# Patient Record
Sex: FEMALE | URBAN - METROPOLITAN AREA
[De-identification: names, ages, dates, MRNs, and addresses within clinical notes are randomized per-mention and may not be internally consistent; named-entity substitution may affect disease eponyms.]

---

## 2021-08-09 ENCOUNTER — OFFICE VISIT (OUTPATIENT)
Dept: URBAN - METROPOLITAN AREA CLINIC 27 | Facility: CLINIC | Age: 51
End: 2021-08-09
Payer: MEDICAID

## 2021-08-09 PROCEDURE — 92004 COMPRE OPH EXAM NEW PT 1/>: CPT | Performed by: OPHTHALMOLOGY

## 2021-08-09 PROCEDURE — 92250 FUNDUS PHOTOGRAPHY W/I&R: CPT | Performed by: OPHTHALMOLOGY

## 2021-08-09 PROCEDURE — 92235 FLUORESCEIN ANGRPH MLTIFRAME: CPT | Performed by: OPHTHALMOLOGY

## 2021-08-09 PROCEDURE — 92134 CPTRZ OPH DX IMG PST SGM RTA: CPT | Performed by: OPHTHALMOLOGY

## 2021-08-09 ASSESSMENT — INTRAOCULAR PRESSURE
OS: 19
OD: 18

## 2021-08-09 NOTE — IMPRESSION/PLAN
Impression: PDR w DME OS Plan: Exam/photos/OCT show ST NVE c/w active PDR with diffuse CSDME OS. FA sweeps 8/9/21 show leakage in the macula and peripheral ischemia with NV. Currently, there is no NVI and the IOP is within normal limits. The Dx, NHx, and Px PDR and DME were discussed at length. Reviewed options of observation, anti-VEGF, steroids, and laser; recommend anti-VEGF series. The patient understands that the goal of treatment is not to improve vision, but to reduce the likelihood of severe vision loss. The patient was urged to work closely with their PCP to avoid systemic complications of diabetic disease. 

1 week, Avastin OU 1/3

## 2021-08-09 NOTE — IMPRESSION/PLAN
Impression: severe NPDR w DME OD Plan: Exam/photos/OCT show DBH c/w severe NPDR with diffuse CSDME OD. FA  sweeps 8/9/21 show macular leakage and areas of ischemia w/o NVE. Reviewed options of observation, anti-VEGF, steroids, and laser; recommend Avastin series.

## 2021-08-16 ENCOUNTER — PROCEDURE (OUTPATIENT)
Dept: URBAN - METROPOLITAN AREA CLINIC 27 | Facility: CLINIC | Age: 51
End: 2021-08-16
Payer: MEDICAID

## 2021-08-16 ASSESSMENT — INTRAOCULAR PRESSURE
OS: 21
OD: 20

## 2021-09-13 ENCOUNTER — PROCEDURE (OUTPATIENT)
Dept: URBAN - METROPOLITAN AREA CLINIC 27 | Facility: CLINIC | Age: 51
End: 2021-09-13
Payer: MEDICAID

## 2021-09-13 DIAGNOSIS — E11.3512 TYPE 2 DIABETES MELLITUS WITH PROLIFERATIVE DIABETIC RETINOPATHY WITH MACULAR EDEMA, LEFT EYE: ICD-10-CM

## 2021-09-13 ASSESSMENT — INTRAOCULAR PRESSURE
OD: 20
OS: 19

## 2021-10-11 ENCOUNTER — PROCEDURE (OUTPATIENT)
Dept: URBAN - METROPOLITAN AREA CLINIC 27 | Facility: CLINIC | Age: 51
End: 2021-10-11
Payer: MEDICAID

## 2021-10-11 ASSESSMENT — INTRAOCULAR PRESSURE
OS: 23
OD: 23

## 2021-11-08 ENCOUNTER — OFFICE VISIT (OUTPATIENT)
Dept: URBAN - METROPOLITAN AREA CLINIC 27 | Facility: CLINIC | Age: 51
End: 2021-11-08
Payer: MEDICAID

## 2021-11-08 DIAGNOSIS — E11.3411 TYPE 2 DIABETES MELLITUS WITH SEVERE NONPROLIFERATIVE DIABETIC RETINOPATHY WITH MACULAR EDEMA, RIGHT EYE: ICD-10-CM

## 2021-11-08 PROCEDURE — 92134 CPTRZ OPH DX IMG PST SGM RTA: CPT | Performed by: OPHTHALMOLOGY

## 2021-11-08 PROCEDURE — 92012 INTRM OPH EXAM EST PATIENT: CPT | Performed by: OPHTHALMOLOGY

## 2021-11-08 ASSESSMENT — INTRAOCULAR PRESSURE
OS: 26
OD: 21

## 2021-11-08 NOTE — IMPRESSION/PLAN
Impression: severe NPDR w DME OD Plan: Exam/OCT show severe NPDR with improved diffuse CSDME OD. FA  sweeps 8/9/21 showed macular leakage and areas of ischemia w/o NVE. Reviewed options of observation, anti-VEGF, steroids, and laser; recommend Avastin series.

## 2021-12-06 ENCOUNTER — PROCEDURE (OUTPATIENT)
Dept: URBAN - METROPOLITAN AREA CLINIC 27 | Facility: CLINIC | Age: 51
End: 2021-12-06
Payer: MEDICAID

## 2021-12-06 ASSESSMENT — INTRAOCULAR PRESSURE
OS: 21
OD: 21

## 2022-02-21 ENCOUNTER — OFFICE VISIT (OUTPATIENT)
Dept: URBAN - METROPOLITAN AREA CLINIC 27 | Facility: CLINIC | Age: 52
End: 2022-02-21
Payer: MEDICAID

## 2022-02-21 DIAGNOSIS — H26.9 CATARACT: ICD-10-CM

## 2022-02-21 PROCEDURE — 92014 COMPRE OPH EXAM EST PT 1/>: CPT | Performed by: OPHTHALMOLOGY

## 2022-02-21 PROCEDURE — 92134 CPTRZ OPH DX IMG PST SGM RTA: CPT | Performed by: OPHTHALMOLOGY

## 2022-02-21 ASSESSMENT — INTRAOCULAR PRESSURE
OS: 17
OD: 15

## 2022-02-21 NOTE — IMPRESSION/PLAN
Impression: PDR w DME OS Plan: Pt lost to f/u x3 months. Exam/OCT show PDR with persistent CSDME OS. FA sweeps 8/9/21 showed leakage in the macula and peripheral ischemia with ST NVE. Reviewed options of observation, anti-VEGF, steroids, and laser; recommend Avastin series. 

4 weeks, photos/FA sweeps OU, Avastin OU 2/3

## 2022-02-21 NOTE — IMPRESSION/PLAN
Impression: severe NPDR w DME OD Plan: Exam/OCT show severe NPDR with CSDME OD. FA sweeps 8/9/21 showed macular leakage and areas of ischemia w/o NVE. Reviewed options of observation, anti-VEGF, steroids, and laser; recommend Avastin series.

## 2022-03-21 ENCOUNTER — OFFICE VISIT (OUTPATIENT)
Dept: URBAN - METROPOLITAN AREA CLINIC 27 | Facility: CLINIC | Age: 52
End: 2022-03-21
Payer: MEDICAID

## 2022-03-21 PROCEDURE — 92250 FUNDUS PHOTOGRAPHY W/I&R: CPT | Performed by: OPHTHALMOLOGY

## 2022-03-21 PROCEDURE — 92235 FLUORESCEIN ANGRPH MLTIFRAME: CPT | Performed by: OPHTHALMOLOGY

## 2022-03-21 PROCEDURE — 92134 CPTRZ OPH DX IMG PST SGM RTA: CPT | Performed by: OPHTHALMOLOGY

## 2022-03-21 ASSESSMENT — INTRAOCULAR PRESSURE
OD: 17
OS: 16

## 2022-03-21 NOTE — IMPRESSION/PLAN
Impression: severe NPDR w DME OD Plan: Photos 3/21/22 showed severe NPDR with CSDME OD. FA sweeps 3/21/22 showed macular leakage and areas of ischemia w/o NVE. Reviewed options of observation, anti-VEGF, steroids, and laser; recommend Avastin series.

## 2022-03-21 NOTE — IMPRESSION/PLAN
Impression: PDR w DME OS Plan: Photos 3/21/22 showed PDR with persistent CSDME OS. FA sweeps 3/21/22 showed leakage in the macula and peripheral ischemia with ST NVE. Reviewed options of observation, anti-VEGF, steroids, and laser; recommend Avastin series. 

4 weeks, Avastin OU 3/3

## 2022-04-25 ENCOUNTER — PROCEDURE (OUTPATIENT)
Dept: URBAN - METROPOLITAN AREA CLINIC 27 | Facility: CLINIC | Age: 52
End: 2022-04-25
Payer: MEDICAID

## 2022-04-25 DIAGNOSIS — E11.3411 TYPE 2 DIABETES MELLITUS WITH SEVERE NONPROLIFERATIVE DIABETIC RETINOPATHY WITH MACULAR EDEMA, RIGHT EYE: Primary | ICD-10-CM

## 2022-04-25 DIAGNOSIS — E11.3512 TYPE 2 DIABETES MELLITUS WITH PROLIFERATIVE DIABETIC RETINOPATHY WITH MACULAR EDEMA, LEFT EYE: ICD-10-CM

## 2022-04-25 ASSESSMENT — INTRAOCULAR PRESSURE
OS: 21
OD: 21

## 2022-06-06 ENCOUNTER — OFFICE VISIT (OUTPATIENT)
Dept: URBAN - METROPOLITAN AREA CLINIC 27 | Facility: CLINIC | Age: 52
End: 2022-06-06
Payer: MEDICAID

## 2022-06-06 DIAGNOSIS — E11.3411 TYPE 2 DIABETES MELLITUS WITH SEVERE NONPROLIFERATIVE DIABETIC RETINOPATHY WITH MACULAR EDEMA, RIGHT EYE: ICD-10-CM

## 2022-06-06 DIAGNOSIS — E11.3512 TYPE 2 DIABETES MELLITUS WITH PROLIFERATIVE DIABETIC RETINOPATHY WITH MACULAR EDEMA, LEFT EYE: Primary | ICD-10-CM

## 2022-06-06 PROCEDURE — 92012 INTRM OPH EXAM EST PATIENT: CPT | Performed by: OPHTHALMOLOGY

## 2022-06-06 PROCEDURE — 92134 CPTRZ OPH DX IMG PST SGM RTA: CPT | Performed by: OPHTHALMOLOGY

## 2022-06-06 ASSESSMENT — INTRAOCULAR PRESSURE
OS: 26
OD: 26

## 2022-06-06 NOTE — IMPRESSION/PLAN
Impression: PDR w DME OS Plan: Exam/OCT show PDR with persistent CSDME OS. Photos 3/21/22 showed PDR with persistent CSDME OS. FA sweeps 3/21/22 showed leakage in the macula and peripheral ischemia with ST NVE. Reviewed options of observation, anti-VEGF, steroids, and laser; recommend Avastin series. 

4 weeks, Avastin OU 2/3

## 2022-06-06 NOTE — IMPRESSION/PLAN
Impression: severe NPDR w DME OD Plan: Exam/OCT show severe NPDR with persistent CSDME/lipid OD. Photos 3/21/22 showed severe NPDR with CSDME OD. FA sweeps 3/21/22 showed macular leakage and areas of ischemia w/o NVE. Reviewed options of observation, anti-VEGF, steroids, and laser; recommend Avastin series.

## 2022-07-05 ENCOUNTER — PROCEDURE (OUTPATIENT)
Dept: URBAN - METROPOLITAN AREA CLINIC 27 | Facility: CLINIC | Age: 52
End: 2022-07-05
Payer: MEDICAID

## 2022-07-05 DIAGNOSIS — E11.3512 TYPE 2 DIABETES MELLITUS WITH PROLIFERATIVE DIABETIC RETINOPATHY WITH MACULAR EDEMA, LEFT EYE: ICD-10-CM

## 2022-07-05 DIAGNOSIS — E11.3411 TYPE 2 DIABETES MELLITUS WITH SEVERE NONPROLIFERATIVE DIABETIC RETINOPATHY WITH MACULAR EDEMA, RIGHT EYE: Primary | ICD-10-CM

## 2022-07-05 ASSESSMENT — INTRAOCULAR PRESSURE
OD: 32
OS: 28

## 2022-08-02 ENCOUNTER — OFFICE VISIT (OUTPATIENT)
Dept: URBAN - METROPOLITAN AREA CLINIC 27 | Facility: CLINIC | Age: 52
End: 2022-08-02
Payer: MEDICAID

## 2022-08-02 DIAGNOSIS — E11.3411 TYPE 2 DIABETES MELLITUS WITH SEVERE NONPROLIFERATIVE DIABETIC RETINOPATHY WITH MACULAR EDEMA, RIGHT EYE: Primary | ICD-10-CM

## 2022-08-02 DIAGNOSIS — E11.3512 TYPE 2 DIABETES MELLITUS WITH PROLIFERATIVE DIABETIC RETINOPATHY WITH MACULAR EDEMA, LEFT EYE: ICD-10-CM

## 2022-08-02 ASSESSMENT — INTRAOCULAR PRESSURE
OD: 24
OS: 25

## 2022-09-26 ENCOUNTER — OFFICE VISIT (OUTPATIENT)
Dept: URBAN - METROPOLITAN AREA CLINIC 27 | Facility: CLINIC | Age: 52
End: 2022-09-26
Payer: MEDICAID

## 2022-09-26 DIAGNOSIS — E11.3512 TYPE 2 DIABETES MELLITUS WITH PROLIFERATIVE DIABETIC RETINOPATHY WITH MACULAR EDEMA, LEFT EYE: Primary | ICD-10-CM

## 2022-09-26 DIAGNOSIS — E11.3411 TYPE 2 DIABETES MELLITUS WITH SEVERE NONPROLIFERATIVE DIABETIC RETINOPATHY WITH MACULAR EDEMA, RIGHT EYE: ICD-10-CM

## 2022-09-26 DIAGNOSIS — H26.9 CATARACT: ICD-10-CM

## 2022-09-26 PROCEDURE — 92134 CPTRZ OPH DX IMG PST SGM RTA: CPT | Performed by: OPHTHALMOLOGY

## 2022-09-26 PROCEDURE — 99213 OFFICE O/P EST LOW 20 MIN: CPT | Performed by: OPHTHALMOLOGY

## 2022-09-26 ASSESSMENT — INTRAOCULAR PRESSURE
OS: 18
OD: 14

## 2022-09-26 NOTE — IMPRESSION/PLAN
Impression: severe NPDR w DME OD Plan: Exam/OCT show severe NPDR with persistent CSDME/lipid OD. Photos 3/21/22 showed severe NPDR with CSDME OD. FA sweeps 3/21/22 showed macular leakage and areas of ischemia w/o NVE. Reviewed options of observation, anti-VEGF, steroids, and laser; recommend Avastin OD today.  Given persistence, submit [facility] BI.

## 2022-09-26 NOTE — IMPRESSION/PLAN
Impression: PDR w DME OS Plan: Exam/OCT show PDR with persistent CSDME OS. Photos 3/21/22 showed PDR with persistent CSDME OS. FA sweeps 3/21/22 showed leakage in the macula and peripheral ischemia with ST NVE. Reviewed options of observation, anti-VEGF, steroids, and laser; recommend Avastin today. Given persistence, submit [facility] BI. 

4 weeks, Luc[facility]is OU 2/3

## 2022-10-31 ENCOUNTER — OFFICE VISIT (OUTPATIENT)
Dept: URBAN - METROPOLITAN AREA CLINIC 27 | Facility: CLINIC | Age: 52
End: 2022-10-31
Payer: MEDICAID

## 2022-10-31 DIAGNOSIS — E11.3512 TYPE 2 DIABETES MELLITUS WITH PROLIFERATIVE DIABETIC RETINOPATHY WITH MACULAR EDEMA, LEFT EYE: ICD-10-CM

## 2022-10-31 DIAGNOSIS — E11.3411 TYPE 2 DIABETES MELLITUS WITH SEVERE NONPROLIFERATIVE DIABETIC RETINOPATHY WITH MACULAR EDEMA, RIGHT EYE: Primary | ICD-10-CM

## 2022-10-31 ASSESSMENT — INTRAOCULAR PRESSURE
OD: 11
OS: 17

## 2022-11-28 ENCOUNTER — PROCEDURE (OUTPATIENT)
Dept: URBAN - METROPOLITAN AREA CLINIC 27 | Facility: CLINIC | Age: 52
End: 2022-11-28
Payer: MEDICAID

## 2022-11-28 DIAGNOSIS — E11.3512 TYPE 2 DIABETES MELLITUS WITH PROLIFERATIVE DIABETIC RETINOPATHY WITH MACULAR EDEMA, LEFT EYE: ICD-10-CM

## 2022-11-28 DIAGNOSIS — E11.3411 TYPE 2 DIABETES MELLITUS WITH SEVERE NONPROLIFERATIVE DIABETIC RETINOPATHY WITH MACULAR EDEMA, RIGHT EYE: Primary | ICD-10-CM

## 2022-11-28 ASSESSMENT — INTRAOCULAR PRESSURE
OD: 21
OS: 23

## 2023-01-03 ENCOUNTER — OFFICE VISIT (OUTPATIENT)
Dept: URBAN - METROPOLITAN AREA CLINIC 27 | Facility: CLINIC | Age: 53
End: 2023-01-03
Payer: MEDICAID

## 2023-01-03 DIAGNOSIS — E11.3411 TYPE 2 DIABETES MELLITUS WITH SEVERE NONPROLIFERATIVE DIABETIC RETINOPATHY WITH MACULAR EDEMA, RIGHT EYE: ICD-10-CM

## 2023-01-03 DIAGNOSIS — E11.3512 TYPE 2 DIABETES MELLITUS WITH PROLIFERATIVE DIABETIC RETINOPATHY WITH MACULAR EDEMA, LEFT EYE: Primary | ICD-10-CM

## 2023-01-03 PROCEDURE — 92134 CPTRZ OPH DX IMG PST SGM RTA: CPT | Performed by: OPHTHALMOLOGY

## 2023-01-03 PROCEDURE — 92012 INTRM OPH EXAM EST PATIENT: CPT | Performed by: OPHTHALMOLOGY

## 2023-01-03 ASSESSMENT — INTRAOCULAR PRESSURE
OS: 19
OD: 20

## 2023-01-03 NOTE — IMPRESSION/PLAN
Impression: severe NPDR w DME OD Plan: Exam/OCT show severe NPDR with improved but persistent CSDME/lipid OD. Photos 3/21/22 showed severe NPDR with CSDME OD. FA sweeps 3/21/22 showed macular leakage and areas of ischemia w/o NVE. Reviewed options of observation, anti-VEGF, steroids, and laser; recommend Lucentis series OD.

## 2023-01-03 NOTE — IMPRESSION/PLAN
Impression: PDR w DME OS Plan: Exam/OCT show PDR with improved but persistent CSDME OS. Photos 3/21/22 showed PDR with persistent CSDME OS. FA sweeps 3/21/22 showed leakage in the macula and peripheral ischemia with ST NVE. Reviewed options of observation, anti-VEGF, steroids, and laser; recommend Lucentis series OS. 

4 weeks, Lucentis OU 2/3 (FA sweeps after series)

## 2023-01-31 ENCOUNTER — PROCEDURE (OUTPATIENT)
Dept: URBAN - METROPOLITAN AREA CLINIC 27 | Facility: CLINIC | Age: 53
End: 2023-01-31
Payer: MEDICAID

## 2023-01-31 DIAGNOSIS — E11.3512 TYPE 2 DIABETES MELLITUS WITH PROLIFERATIVE DIABETIC RETINOPATHY WITH MACULAR EDEMA, LEFT EYE: Primary | ICD-10-CM

## 2023-01-31 DIAGNOSIS — E11.3411 TYPE 2 DIABETES MELLITUS WITH SEVERE NONPROLIFERATIVE DIABETIC RETINOPATHY WITH MACULAR EDEMA, RIGHT EYE: ICD-10-CM

## 2023-01-31 ASSESSMENT — INTRAOCULAR PRESSURE
OD: 27
OS: 28
